# Patient Record
Sex: MALE | Race: WHITE | URBAN - METROPOLITAN AREA
[De-identification: names, ages, dates, MRNs, and addresses within clinical notes are randomized per-mention and may not be internally consistent; named-entity substitution may affect disease eponyms.]

---

## 2017-06-26 ENCOUNTER — TRANSFERRED RECORDS (OUTPATIENT)
Dept: HEALTH INFORMATION MANAGEMENT | Facility: CLINIC | Age: 42
End: 2017-06-26

## 2017-09-22 ENCOUNTER — TRANSFERRED RECORDS (OUTPATIENT)
Dept: HEALTH INFORMATION MANAGEMENT | Facility: CLINIC | Age: 42
End: 2017-09-22

## 2017-11-20 ENCOUNTER — ALLIED HEALTH/NURSE VISIT (OUTPATIENT)
Dept: NEUROLOGY | Facility: CLINIC | Age: 42
End: 2017-11-20

## 2017-11-20 ENCOUNTER — OFFICE VISIT (OUTPATIENT)
Dept: NEUROLOGY | Facility: CLINIC | Age: 42
End: 2017-11-20

## 2017-11-20 VITALS
SYSTOLIC BLOOD PRESSURE: 155 MMHG | HEART RATE: 63 BPM | HEIGHT: 72 IN | BODY MASS INDEX: 34.27 KG/M2 | WEIGHT: 253 LBS | DIASTOLIC BLOOD PRESSURE: 89 MMHG

## 2017-11-20 DIAGNOSIS — R56.9 SEIZURES (H): Primary | ICD-10-CM

## 2017-11-20 DIAGNOSIS — G40.109 LOCALIZATION-RELATED EPILEPSY (H): Primary | ICD-10-CM

## 2017-11-20 RX ORDER — NICOTINE 21 MG/24HR
1 PATCH, TRANSDERMAL 24 HOURS TRANSDERMAL EVERY 24 HOURS
Status: ON HOLD | COMMUNITY
End: 2017-11-22

## 2017-11-20 RX ORDER — OXCARBAZEPINE 300 MG/1
300 TABLET, FILM COATED ORAL 2 TIMES DAILY
Status: ON HOLD | COMMUNITY
End: 2017-11-22

## 2017-11-20 RX ORDER — ESCITALOPRAM OXALATE 20 MG/1
20 TABLET ORAL DAILY
Status: ON HOLD | COMMUNITY
End: 2017-11-22

## 2017-11-20 RX ORDER — POLYETHYLENE GLYCOL 3350 17 G
2 POWDER IN PACKET (EA) ORAL
Status: ON HOLD | COMMUNITY
End: 2017-11-22

## 2017-11-20 RX ORDER — HYDROXYZINE HYDROCHLORIDE 25 MG/1
25 TABLET, FILM COATED ORAL 3 TIMES DAILY PRN
Status: ON HOLD | COMMUNITY
End: 2017-11-22

## 2017-11-20 RX ORDER — ACETAMINOPHEN 325 MG/1
650 TABLET ORAL EVERY 6 HOURS PRN
Status: ON HOLD | COMMUNITY
End: 2017-11-22

## 2017-11-20 NOTE — PROGRESS NOTES
CPT:04633-68  OP[facility]hr Video EEG  [facility]  Dr. Mireles
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
## 2017-11-20 NOTE — MR AVS SNAPSHOT
After Visit Summary   11/20/2017    Rakesh Hamilton    MRN: [id number]           Patient Information     Date Of Birth          1975        Visit Information        Provider Department      11/20/2017 9:30 AM [facility]         Follow-ups after your visit        Your next 10 appointments already scheduled     Nov 20, 2017  1:00 PM CST   New Patient Visit with Sara Parekh MD   [facility] ([facility])    [address], [address]  [facility] [address]   [phone]            Nov 21, 2017 12:30 PM CST   Education with [facility] Nurse 1, [facility] PATIENT EDUCATION   [facility] ([facility])    [address], [address]  [facility] [id number] [phone]            Nov 22, 2017 10:00 AM CST   (Arrive by 9:15 AM)   Video Hookup Visit with [facility] EEG TECH 4   [facility] EEG ([facility])    [facility]  [address] [facility] [address] [phone]           [place]: Your appointment is scheduled at [facility]. [address] [place] [address]              Who to contact     Please call your clinic at [phone] to:    Ask questions about your health    Make or cancel appointments    Discuss your medicines    Learn about your test results    Speak to your doctor   If you have compliments or concerns about an experience at your clinic, or if you wish to file a complaint, please contact [facility] Patient Relations at [phone] or email us at [email or web address]         Additional Information About Your Visit        MyChart Information     BookMyShow is an electronic gateway that provides easy, online access to your medical records. With BookMyShow, you can request a clinic appointment, read your test results, renew a prescription or communicate with your care team.     To sign up for  [facility] visit the website at [email or web address]   You will be asked to enter the access code listed below, as well as some personal information. Please follow the directions to create your username and password.     Your access code is: [email or web address]  Expires: 2018 12:42 PM     Your access code will  in 90 days. If you need help or a new code, please contact your [facility] or call [phone] for assistance.        Care EveryWhere ID     This is your Care EveryWhere ID. This could be used by other organizations to access your [facility] medical records  [id number]         Blood Pressure from Last 3 Encounters:   No data found for BP    Weight from Last 3 Encounters:   No data found for Wt              Today, you had the following     No orders found for display       Primary Care Provider Office Phone # Fax #    Nisa France Palmer [phone]       [address] [place] [address]        Equal Access to Services     [facility] Center[facility]IS : Hadii aad ku hadasho Soomaali, waaxda luqadaha, qaybta kaalmada adeegyada, waxay idiin hayaamatthew starr kharagera brandt . So [facility] [phone].    ATENCIÓN: Si habla español, tiene a darnell disposición servicios gratuitos de asistencia lingüística. Llame al [phone].    We comply with applicable federal civil rights laws and [place] laws. We do not discriminate on the basis of race, color, national origin, age, disability, sex, sexual orientation, or gender identity.            Thank you!     Thank you for choosing [facility] EPILEPSY [facility]  for your care. Our goal is always to provide you with excellent care. Hearing back from our patients is one way we can continue to improve our services. Please take a few minutes to complete the written survey that you may receive in the mail after your visit with us. Thank you!             Your Updated Medication List - Protect others around you: Learn how to safely use,  store and throw away your medicines at www.disposemymeds.org.          This list is accurate as of: 11/20/17 12:42 PM.  Always use your most recent med list.                   Brand Name Dispense Instructions for use Diagnosis    cholecalciferol 5000 UNITS Tabs tablet    vitamin D3     Take 5,000 Units by mouth daily Take 1 tab by mouth daily        COMMIT 2 MG lozenge   Generic drug:  nicotine polacrilex      Place 2 mg inside cheek every hour as needed for smoking cessation Take 1 lozenge by mouth every hour as needed for smoking cessation for up to 30 days        hydrOXYzine 25 MG tablet    ATARAX     Take 25 mg by mouth 3 times daily as needed for itching Take 1 tab by mouth 3 times daily as needed for anxiety        LEXAPRO 20 MG tablet   Generic drug:  escitalopram      Take 20 mg by mouth daily        NICODERM CQ 14 MG/24HR 24 hr patch   Generic drug:  nicotine      Place 1 patch onto the skin every 24 hours Place 1 patch onto the skin daily for 30 days        OXcarbazepine 300 MG tablet    TRILEPTAL     Take 300 mg by mouth 2 times daily Take 3 tabs by mouth 2 times daily        TYLENOL 325 MG tablet   Generic drug:  acetaminophen      Take 650 mg by mouth every 6 hours as needed for mild pain Take 650 mg by mouth every 6 hours as needed for Mild pain 1-3
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
## 2017-11-20 NOTE — MR AVS SNAPSHOT
After Visit Summary   11/20/2017    Rakesh Hamilton    MRN: [id number]           Patient Information     Date Of Birth          1975        Visit Information        Provider Department      11/20/2017 1:00 PM Sara Parekh MD [facility]        Today's Diagnoses     Localization-related epilepsy (H)    -  1       Follow-ups after your visit        Additional Services     [facility] Patient Education Referral                 Your next 10 appointments already scheduled     Nov 21, 2017 12:30 PM CST   Education with [facility] Nurse 1, [facility] PATIENT EDUCATION   [facility]    [address] [place], [address]  [facility] [id number] [phone]            Nov 22, 2017 10:00 AM CST   (Arrive by 9:15 AM)   Video Hookup Visit with [facility] EEG TECH 4   [facility] EEG ([facility])    [facility]  [address] [address] [phone]           [place]: Your appointment is scheduled at [facility]. [address] [place] [address]              Who to contact     Please call your clinic at [phone] to:    Ask questions about your health    Make or cancel appointments    Discuss your medicines    Learn about your test results    Speak to your doctor   If you have compliments or concerns about an experience at your clinic, or if you wish to file a complaint, please contact [facility] Patient Relations at [phone] or email us at [email or web address]         Additional Information About Your Visit        MyChart Information     FrenchWe[employer] is an electronic gateway that provides easy, online access to your medical records. With [facility], you can request a clinic appointment, read your test results, renew a prescription or communicate with your care team.     To sign up for [employer]bt visit the website at [email or web address]    You will be asked to enter the access code listed below, as well as some personal information. Please follow the directions to create your username and password.     Your access code is: [email or web address]  Expires: 2018 12:42 PM     Your access code will  in 90 days. If you need help or a new code, please contact your [facility] or call [phone] for assistance.        Care EveryWhere ID     This is your Care EveryWhere ID. This could be used by other organizations to access your [place] medical records  [id number]        Your Vitals Were     Pulse Height BMI (Body Mass Index)             63 6' (182.9 cm) 34.31 kg/m2          Blood Pressure from Last 3 Encounters:   17 155/89    Weight from Last 3 Encounters:   17 253 lb (114.8 kg)              We Performed the Following     Admit to Inpatient     [facility] Patient Education Referral     Neuropsychological testing        Primary Care Provider Office Phone # Fax #    Nisa France Palmer [phone]       [address] [place] [address]        Equal Access to Services     [facility] AH: Hadii aad ku hadasho Soomaali, waaxda luqadaha, qaybta kaalmada adeegyada, rhiannon brandt . So [facility] [phone].    ATENCIÓN: Si habla español, tiene a darnell disposición servicios gratuitos de asistencia lingüística. Llame al [phone].    We comply with applicable federal civil rights laws and [place] laws. We do not discriminate on the basis of race, color, national origin, age, disability, sex, sexual orientation, or gender identity.            Thank you!     Thank you for choosing [facility]  for your care. Our goal is always to provide you with excellent care. Hearing back from our patients is one way we can continue to improve our services. Please take a few minutes to complete the written survey that you may receive in the mail after your visit with us.  Thank you!             Your Updated Medication List - Protect others around you: Learn how to safely use, store and throw away your medicines at www.disposemymeds.org.          This list is accurate as of: 11/20/17  2:23 PM.  Always use your most recent med list.                   Brand Name Dispense Instructions for use Diagnosis    cholecalciferol 5000 UNITS Tabs tablet    vitamin D3     Take 5,000 Units by mouth daily Take 1 tab by mouth daily        COMMIT 2 MG lozenge   Generic drug:  nicotine polacrilex      Place 2 mg inside cheek every hour as needed for smoking cessation Take 1 lozenge by mouth every hour as needed for smoking cessation for up to 30 days        hydrOXYzine 25 MG tablet    ATARAX     Take 25 mg by mouth 3 times daily as needed for itching Take 1 tab by mouth 3 times daily as needed for anxiety        LEXAPRO 20 MG tablet   Generic drug:  escitalopram      Take 20 mg by mouth daily        NICODERM CQ 14 MG/24HR 24 hr patch   Generic drug:  nicotine      Place 1 patch onto the skin every 24 hours Place 1 patch onto the skin daily for 30 days        OXcarbazepine 300 MG tablet    TRILEPTAL     Take 300 mg by mouth 2 times daily Take 3 tabs by mouth 2 times daily        TYLENOL 325 MG tablet   Generic drug:  acetaminophen      Take 650 mg by mouth every 6 hours as needed for mild pain Take 650 mg by mouth every 6 hours as needed for Mild pain 1-3

## 2017-11-21 ENCOUNTER — ALLIED HEALTH/NURSE VISIT (OUTPATIENT)
Dept: NEUROLOGY | Facility: CLINIC | Age: 42
End: 2017-11-21

## 2017-11-21 ENCOUNTER — TRANSFERRED RECORDS (OUTPATIENT)
Dept: HEALTH INFORMATION MANAGEMENT | Facility: CLINIC | Age: 42
End: 2017-11-21

## 2017-11-21 DIAGNOSIS — G40.109 LOCALIZATION-RELATED FOCAL EPILEPSY WITH SIMPLE PARTIAL SEIZURES (H): Primary | ICD-10-CM

## 2017-11-21 NOTE — NURSING NOTE
"Care Coordinator Visit    Name:  Rakesh Hamilton   Date:    2017   :   1975   MRN:  [id number]     Time Spent:  Face-to-face time 75 minutes  See scanned Wellness Materials checklist regarding videos viewed and handouts provided.     I met with the patient, his mother, and aunt after seizure videos were viewed, as listed in the checklist.     Patient History:  From Dr. 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  The patient has not been compliant with his medications.  He quit taking oxcarbazepine a couple months ago.  He tried levetiracetam and lamotrigine.  He had side effects, but his seizures also were not controlled.  They are looking for nonmedical options, such as surgical options, and they also have applied for medical marijuana.  I explained he has to fail at least 2 medications before we consider surgery.   They state the doses were maxed, but I do not have doses or levels for those.  He is scheduled to get admitted to the Epilepsy Monitoring Unit.  Will try to record a few seizures for characterization and localization.  He would need a a brain MRI to see the extent of his brain injury and the surgeries he had.  Based on imaging, EEG and ictal findings will see how good of a surgical candidate he is.  I suppose he already has lots of scars and adhesions due to his previous surgeries.  We also need to get his records to prove he was refractory to medications.  He would need to start an antiepileptic medication upon discharge.  He has only tried lamotrigine, levetiracetam and oxcarbazepine.  He has a sulfa allergy, so should avoid topiramate and zonisamide.  I have some reservation for Lyrica and Depakote as he is overweight and with Depakote there is also concern if he pursue surgery.  We can try lacosamide, carbamazepine, phenytoin, or Fycompa perhaps in this order.\"      The  What Do You Know About Epilepsy  questionnaire " was reviewed with the patient.  Areas focused on were the difference between generalized and partial seizures, healthy lifestyle choices and non-epileptic events.  We also reviewed all questions that were answered incorrectly.    Basic introduction to epilepsy was done, including the difference between epileptic and nonepileptic events (including physiologic and psychogenic nonepileptic events) and the difference between partial onset and generalized onset epileptic seizures.  We discussed appropriate treatment for each of these and discussed why an accurate diagnosis is important.      We also discussed the importance of adequate sleep and good sleep hygiene, maintaining a healthy diet, taking a multivitamin, and getting exercise.  We discussed that the consumption of alcohol will affect how the body metabolizes medications and that binge drinking can cause withdrawal seizures.  We discussed stress and illness and how these can affect seizure control.  We discussed how over-the counter diet aids, energy drinks, caffeine, and some herbal supplements can affect seizure control.  We also discussed taking showers instead of baths and keeping the shower drain clear so as not to allow water to pool.  We discussed driving. The patient understands that he is responsible for knowing and understanding his  state driving laws as well as for reporting any loss of contact or voluntary control to the Department of Motor Vehicles. We discussed use of firearms; he was informed that we advise him to refrain from use of firearms. Rakesh  also understands the liabilities and risks related to driving and firearm use.    Introduction to [facility] was performed, including hospital policies, how seizures are induced, hygiene breaks, and the importance of staff assistance whenever  he  gets out of bed.  Rakesh is aware that the length of stay is generally five days, but that this is dependent on what is captured on EEG.     During our  general education session Rakesh asked many appropriate questions, which were answered to  his satisfaction.     Rakesh Hamilton comes into clinic today at the request of Dr. Mireles Ordering Provider for Pt Teaching general baseline epilepsy education and preadmission to [facility] VEEG education..      This service provided today was under the supervising provider of the day Dr. Mireles, who was available if needed.    Deepa Solares

## 2017-11-21 NOTE — MR AVS SNAPSHOT
After Visit Summary   11/21/2017    Rakesh Hamilton    MRN: [id number]           Patient Information     Date Of Birth          1975        Visit Information        Provider Department      11/21/2017 12:30 PM 1, [facility] Nurse; [facility] PATIENT EDUCATION [facility]        Today's Diagnoses     Localization-related focal epilepsy with simple partial seizures (H)    -  1       Follow-ups after your visit        Your next 10 appointments already scheduled     Nov 22, 2017 10:00 AM CST   (Arrive by 9:15 AM)   Video Hookup Visit with [facility] EEG TECH 4   [facility] EEG ([facility])    [facility]  [address] [address] [phone]           [place]: Your appointment is scheduled at [facility]. [address] [place] [address]              Who to contact     Please call your clinic at [phone] to:    Ask questions about your health    Make or cancel appointments    Discuss your medicines    Learn about your test results    Speak to your doctor   If you have compliments or concerns about an experience at your clinic, or if you wish to file a complaint, please contact [facility] Patient Relations at [phone] or email us at [email or web address]         Additional Information About Your Visit        MyChart Information     [employer] is an electronic gateway that provides easy, online access to your medical records. With [facility], you can request a clinic appointment, read your test results, renew a prescription or communicate with your care team.     To sign up for [employer] visit the website at [email or web address]   You will be asked to enter the access code listed below, as well as some personal information. Please follow the directions to create your username and password.     Your access code is: [email or web address]  Expires: 2/18/2018 12:42 PM     Your access  code will  in 90 days. If you need help or a new code, please contact your [facility] or call [phone] for assistance.        Care EveryWhere ID     This is your Care EveryWhere ID. This could be used by other organizations to access your [place] medical records  [id number]         Blood Pressure from Last 3 Encounters:   17 155/89    Weight from Last 3 Encounters:   17 253 lb (114.8 kg)              Today, you had the following     No orders found for display       Primary Care Provider Office Phone # Fax #    Nisa France Palmer [phone]       [address] [place] [address]        Equal Access to Services     [facility] : Hadii aad ku hadasho Soomaali, waaxda luqadaha, qaybta kaalmada adeegyada, waxay idiin hayaan adejulius brandt . So [facility] [phone].    ATENCIÓN: Si habla español, tiene a darnell disposición servicios gratuitos de asistencia lingüística. Ll[facility] [phone].    We comply with applicable federal civil rights laws and [place] laws. We do not discriminate on the basis of race, color, national origin, age, disability, sex, sexual orientation, or gender identity.            Thank you!     Thank you for choosing [facility]  for your care. Our goal is always to provide you with excellent care. Hearing back from our patients is one way we can continue to improve our services. Please take a few minutes to complete the written survey that you may receive in the mail after your visit with us. Thank you!             Your Updated Medication List - Protect others around you: Learn how to safely use, store and throw away your medicines at www.disposemymeds.org.          This list is accurate as of: 17  4:06 PM.  Always use your most recent med list.                   Brand Name Dispense Instructions for use Diagnosis    cholecalciferol 5000 UNITS Tabs tablet    vitamin D3     Take 5,000 Units  by mouth daily Take 1 tab by mouth daily        COMMIT 2 MG lozenge   Generic drug:  nicotine polacrilex      Place 2 mg inside cheek every hour as needed for smoking cessation Take 1 lozenge by mouth every hour as needed for smoking cessation for up to 30 days        hydrOXYzine 25 MG tablet    ATARAX     Take 25 mg by mouth 3 times daily as needed for itching Take 1 tab by mouth 3 times daily as needed for anxiety        LEXAPRO 20 MG tablet   Generic drug:  escitalopram      Take 20 mg by mouth daily        NICODERM CQ 14 MG/24HR 24 hr patch   Generic drug:  nicotine      Place 1 patch onto the skin every 24 hours Place 1 patch onto the skin daily for 30 days        OXcarbazepine 300 MG tablet    TRILEPTAL     Take 300 mg by mouth 2 times daily Take 3 tabs by mouth 2 times daily        TYLENOL 325 MG tablet   Generic drug:  acetaminophen      Take 650 mg by mouth every 6 hours as needed for mild pain Take 650 mg by mouth every 6 hours as needed for Mild pain 1-3

## 2017-11-22 ENCOUNTER — HOSPITAL ENCOUNTER (INPATIENT)
Facility: CLINIC | Age: 42
LOS: 2 days | Discharge: HOME | DRG: 101 | End: 2017-11-24
Attending: PSYCHIATRY & NEUROLOGY | Admitting: PSYCHIATRY & NEUROLOGY
Payer: MEDICARE

## 2017-11-22 ENCOUNTER — ALLIED HEALTH/NURSE VISIT (OUTPATIENT)
Dept: NEUROLOGY | Facility: CLINIC | Age: 42
DRG: 101 | End: 2017-11-22
Attending: PSYCHIATRY & NEUROLOGY
Payer: MEDICARE

## 2017-11-22 DIAGNOSIS — G40.319 GENERALIZED CONVULSIVE EPILEPSY WITH INTRACTABLE EPILEPSY (H): Primary | ICD-10-CM

## 2017-11-22 DIAGNOSIS — E55.9 VITAMIN D DEFICIENCY: ICD-10-CM

## 2017-11-22 DIAGNOSIS — G40.109 LOCALIZATION-RELATED FOCAL EPILEPSY WITH SIMPLE PARTIAL SEIZURES (H): Primary | ICD-10-CM

## 2017-11-22 LAB
ALBUMIN SERPL-MCNC: 3.8 G/DL (ref 3.4–5)
ALP SERPL-CCNC: 108 U/L (ref 40–150)
ALT SERPL W P-5'-P-CCNC: 29 U/L (ref 0–70)
ANION GAP SERPL CALCULATED.3IONS-SCNC: 8 MMOL/L (ref 3–14)
AST SERPL W P-5'-P-CCNC: 13 U/L (ref 0–45)
BILIRUB SERPL-MCNC: 0.3 MG/DL (ref 0.2–1.3)
BUN SERPL-MCNC: 11 MG/DL (ref 7–30)
CALCIUM SERPL-MCNC: 8.8 MG/DL (ref 8.5–10.1)
CHLORIDE SERPL-SCNC: 112 MMOL/L (ref 94–109)
CO2 SERPL-SCNC: 23 MMOL/L (ref 20–32)
CREAT SERPL-MCNC: 0.87 MG/DL (ref 0.66–1.25)
DEPRECATED CALCIDIOL+CALCIFEROL SERPL-MC: 19 UG/L (ref 20–75)
ERYTHROCYTE [DISTWIDTH] IN BLOOD BY AUTOMATED COUNT: 14.2 % (ref 10–15)
GFR SERPL CREATININE-BSD FRML MDRD: >90 ML/MIN/1.7M2
GLUCOSE SERPL-MCNC: 95 MG/DL (ref 70–99)
HCT VFR BLD AUTO: 50.2 % (ref 40–53)
HGB BLD-MCNC: 17.2 G/DL (ref 13.3–17.7)
MCH RBC QN AUTO: 31.9 PG (ref 26.5–33)
MCHC RBC AUTO-ENTMCNC: 34.3 G/DL (ref 31.5–36.5)
MCV RBC AUTO: 93 FL (ref 78–100)
PLATELET # BLD AUTO: 255 10E9/L (ref 150–450)
POTASSIUM SERPL-SCNC: 4.2 MMOL/L (ref 3.4–5.3)
PROT SERPL-MCNC: 7.3 G/DL (ref 6.8–8.8)
RBC # BLD AUTO: 5.4 10E12/L (ref 4.4–5.9)
SODIUM SERPL-SCNC: 144 MMOL/L (ref 133–144)
WBC # BLD AUTO: 7.4 10E9/L (ref 4–11)

## 2017-11-22 PROCEDURE — 25000132 ZZH RX MED GY IP 250 OP 250 PS 637: Mod: GY | Performed by: PHYSICIAN ASSISTANT

## 2017-11-22 PROCEDURE — 36415 COLL VENOUS BLD VENIPUNCTURE: CPT | Performed by: PHYSICIAN ASSISTANT

## 2017-11-22 PROCEDURE — 80053 COMPREHEN METABOLIC PANEL: CPT | Performed by: PHYSICIAN ASSISTANT

## 2017-11-22 PROCEDURE — 40000802 ZZH SITE CHECK

## 2017-11-22 PROCEDURE — A9270 NON-COVERED ITEM OR SERVICE: HCPCS | Mod: GY | Performed by: PHYSICIAN ASSISTANT

## 2017-11-22 PROCEDURE — 85027 COMPLETE CBC AUTOMATED: CPT | Performed by: PHYSICIAN ASSISTANT

## 2017-11-22 PROCEDURE — 82306 VITAMIN D 25 HYDROXY: CPT | Performed by: PHYSICIAN ASSISTANT

## 2017-11-22 PROCEDURE — 95951 ZZHC EEG VIDEO EACH 24 HR: CPT | Mod: ZF

## 2017-11-22 PROCEDURE — 40000141 ZZH STATISTIC PERIPHERAL IV START W/O US GUIDANCE

## 2017-11-22 PROCEDURE — 12000008 ZZH R&B INTERMEDIATE UMMC

## 2017-11-22 RX ORDER — DOCUSATE SODIUM 100 MG/1
100 CAPSULE, LIQUID FILLED ORAL 2 TIMES DAILY PRN
Status: DISCONTINUED | OUTPATIENT
Start: 2017-11-22 | End: 2017-11-24 | Stop reason: HOSPADM

## 2017-11-22 RX ORDER — IBUPROFEN 200 MG
200 TABLET ORAL EVERY 6 HOURS PRN
Status: DISCONTINUED | OUTPATIENT
Start: 2017-11-22 | End: 2017-11-24 | Stop reason: HOSPADM

## 2017-11-22 RX ORDER — LIDOCAINE 40 MG/G
CREAM TOPICAL
Status: DISCONTINUED | OUTPATIENT
Start: 2017-11-22 | End: 2017-11-24 | Stop reason: HOSPADM

## 2017-11-22 RX ORDER — LORAZEPAM 2 MG/ML
2 INJECTION INTRAMUSCULAR
Status: DISCONTINUED | OUTPATIENT
Start: 2017-11-22 | End: 2017-11-24 | Stop reason: HOSPADM

## 2017-11-22 RX ORDER — NICOTINE 21 MG/24HR
1 PATCH, TRANSDERMAL 24 HOURS TRANSDERMAL DAILY
Status: DISCONTINUED | OUTPATIENT
Start: 2017-11-22 | End: 2017-11-24 | Stop reason: HOSPADM

## 2017-11-22 RX ORDER — GINSENG 100 MG
CAPSULE ORAL 3 TIMES DAILY PRN
Status: DISCONTINUED | OUTPATIENT
Start: 2017-11-22 | End: 2017-11-24 | Stop reason: HOSPADM

## 2017-11-22 RX ORDER — MULTIVITAMIN,THERAPEUTIC
1 TABLET ORAL DAILY
Status: DISCONTINUED | OUTPATIENT
Start: 2017-11-22 | End: 2017-11-24 | Stop reason: HOSPADM

## 2017-11-22 RX ORDER — ACETAMINOPHEN 325 MG/1
650 TABLET ORAL EVERY 4 HOURS PRN
Status: DISCONTINUED | OUTPATIENT
Start: 2017-11-22 | End: 2017-11-24 | Stop reason: HOSPADM

## 2017-11-22 RX ADMIN — NICOTINE 1 PATCH: 14 PATCH, EXTENDED RELEASE TRANSDERMAL at 11:58

## 2017-11-22 ASSESSMENT — ACTIVITIES OF DAILY LIVING (ADL)
FALL_HISTORY_WITHIN_LAST_SIX_MONTHS: NO
BATHING: 0-->INDEPENDENT
DRESS: 0-->INDEPENDENT
AMBULATION: 0-->INDEPENDENT
COGNITION: 0 - NO COGNITION ISSUES REPORTED
TRANSFERRING: 0-->INDEPENDENT
RETIRED_EATING: 0-->INDEPENDENT
RETIRED_COMMUNICATION: 0-->UNDERSTANDS/COMMUNICATES WITHOUT DIFFICULTY
SWALLOWING: 0-->SWALLOWS FOODS/LIQUIDS WITHOUT DIFFICULTY
TOILETING: 0-->INDEPENDENT

## 2017-11-22 NOTE — IP AVS SNAPSHOT
MRN:[id number]                      After Visit Summary   11/22/2017    Rakesh Hamilton    MRN: [id number]           Thank you!     Thank you for choosing [facility] for your care. Our goal is always to provide you with excellent care. Hearing back from our patients is one way we can continue to improve our services. Please take a few minutes to complete the written survey that you may receive in the mail after you visit with us. Thank you!        Patient Information     Date Of Birth          1975        Designated Caregiver       Most Recent Value    Caregiver    Will someone help with your care after discharge? yes    Name of designated caregiver Sabrina Hamilton     Phone number of caregiver [phone]    Caregiver address same as patient      About your hospital stay     You were admitted on:  November 22, 2017 You last received care in the:  [address] [facility]    You were discharged on:  November 24, 2017        Reason for your hospital stay       You were admitted to the epilepsy monitoring unit at the [facility] for video EEG from 11/22 to 11/24/2017 to characterize your seizures. You did not have any events during your admission. You requested to leave on Day 3 of the study and so this was arranged.                  Who to Call     For medical emergencies, please call 911.  For non-urgent questions about your medical care, please call your primary care provider or clinic, [phone]          Attending Provider     Provider Specialty    Amparo Richter MD Neurology       Primary Care Provider Office Phone # Fax #    Nisa Palmer [phone]       When to contact your care team       Contact [facility] at [phone] for questions regarding your seizures.                  After Care Instructions     Activity       State laws prohibit operating a motor vehicle following any seizure or other episode with loss of  "awareness, or inability to sit up (Varies by state, be aware and comply with the regulations applicable to you). State law may require the licensed  to report any future episode to the DMV . Avoid any activities that might lead to self-injury or injury of others following any event with impaired awareness or impaired motor control. Such activities include but are not limited to holding babies or young children at heights from which they might be injured if dropped, bathing infants or young children in situations in which they might drown without continuous interactive care by an adult who is fully capable at all times during the bath, operating power cutting or other tools, handling firearms, exposure to heights from which you might fall, exposure to vessels with hot cooking oil or water, and swimming alone.            Diet       Resume home diet.            Discharge Instructions       Start taking 2000 mg of vitamin D supplement every day.  Start carbamazepine (Tegretol) and increase as directed.    Try to maintain a regular daily routine, with 8 hours of sleep, regular healthy meals, and routine activity/exercise.   Learn stress reduction techniques, such as controlled breathing exercises and meditation/mindfullness.   Avoid getting over tired, alcohol, and unprescribed drugs.  Avoid herbal remedies, as these may contain substances that alter how your body handles medications, or have undesirable effects on you.    Discuss any over the counter medications with your pharmacist or other health care provider.  If your mood worsens and you have thoughts of hurting yourself or others, seek help immediately. This can be done by calling emergency services \"9-1-1\", or calling other mental health resources such as National Suicide Prevention LifeLine 4-067-807-WSQP (3615), your local [facility] help line, or calling your mental health provider.      Knowing how to relax is not something our society teaches. In fact, it " "teaches us to do the opposite. The relaxation response was developed by the [place]  and physician Dr. Carlos Vee. You can google both \"Carlos Vee\" and \"Relaxation Response\" to find a multitude of information. Breath work is the center of this process and can reduce anxiety and stress markedly.    Dr. Vee talks about the need for four things:    1. A quiet environment    3. A passive mental attitude  2. A comfortable position    4. A mental device    The passive mental attitude means that when distracting thoughts come into your mind while deep breathing, you do not  them. Instead of saying to yourself, \"Darn, I just can't calm my mind,\" say, \"Oh, that was interesting,\" and then refocus on the breath. Our brains are a complex network of connections that were meant to fire. It takes practice, lots of practice to focus and breathe. Try not to  your thoughts as good or bad, they just are.    The mental device can be just about anything. It can be meditation, prayer, saying one sound out loud, whatever you want it to be. The key is to breathe from your belly. Place your hand on your belly. As you inhale to the count of 3 through your nose, feel your hand rising. This helps to remind you to get the breath all the way down into the belly. Exhale to the count of 6 (or double whatever the count of the inhale) through pursed lips. Pursed lips keep the exhale lasting longer and decreases the loss of carbon dioxide. When we breathe fast from our chest, we lose too much carbon dioxide which can cause many of the physical symptoms of anxiety (tremor, lightheadedness, ringing in the ear, feeling of unreality).     So, inhale through your nose, think 'All is well,' Exhale through pursed lips, thinking 'This and every day'. Do this twice a day, first thing in the morning and before dinner in the evening. Do it for a minimum of 10 minutes, a maximum of 20 minutes (Dr. Carlos Vee's Relaxation " Response). These two phrases are just suggestions. Change it to whatever resonates with you.    Remember, practice this daily, so that you will have this skill when you need it most.     (Text used with permission from Davina Hastings, RN, BA, HN-BC)                  Follow-up Appointments     [facility] Follow-up and recommended labs and tests       The following appointments have been made for you:    Follow up telephone visit with Vivi COLVIN RN Care Coordinator Monday Nov 27 11:30.  Follow up telephone visit with Dr. Mireles in Dec 14th at 11:45am            Follow Up and recommended labs and tests       Make an appointment with a local psychiatrist to manage anxiety and depression.                  Your next 10 appointments already scheduled     Nov 27, 2017 11:30 AM CST   Telephone Call with [facility] Nurse 2   [facility])    [address], [address]  [facility] [address] [phone]           Note: this is not an onsite visit; there is no need to come to the facility.            Dec 14, 2017 11:45 AM CST   Telephone Call with Sara Parekh MD   [facility] ([facility])    [address], [address]  [facility] [address] [phone]           Note: This is not an onsite visit; there is no need to come to the facility.              Future tests that were ordered for you     Sodium           Carbamazepine and 10 11 epoxide                 Further instructions from your care team         Times of Days   am  pm      Medication Tablet Size Number of Tablets/Capsules Total Daily Dosage     carbamazepine ER  200 mg               Day 1-3     0 tab    1 tab  (200 mg)    200     Day 4-7     1 tab  (200 mg   1 tab  (200 mg   400     Day 8-14     1 tab  (200 mg   2 tab   (400 mg)    600     Day 14 and onward     2 tab   (400 mg)    2 tab   (400 mg)                             YOU MAY HOLD ON THE INCREASE ON DAY 8 IF YOU FEEL UNSTEADY,  "DIZZY, OR NAUSEATED THEN INCREASE AS SYMPTOMS SUBSIDE.   Please have your levels check in 3 weeks from start of medication.     * * *Do not store medications in the bathroom.  Keep medications away from children!* * *       Pending Results     No orders found from 2017 to 2017.            Statement of Approval     Ordered          17 1229  I have reviewed and agree with all the recommendations and orders detailed in this document.  EFFECTIVE NOW     Approved and electronically signed by:  Amparo Richter MD             Admission Information     Date & Time Provider Department Dept. Phone    2017 Amparo Richter MD [address] [facility] [phone]      Your Vitals Were     Blood Pressure Pulse Temperature Respirations Height Weight    151/89 60 96  F (35.6  C) (Oral) 16 1.829 m (6') 113.6 kg (250 lb 8 oz)    Pulse Oximetry BMI (Body Mass Index)                99% 33.97 kg/m2          MyChart Information     [employer] lets you send messages to your doctor, view your test results, renew your prescriptions, schedule appointments and more. To sign up, go to [email or web address] . Click on \"Log in\" on the left side of the screen, which will take you to the Welcome page. Then click on \"Sign up Now\" on the right side of the page.     You will be asked to enter the access code listed below, as well as some personal information. Please follow the directions to create your username and password.     Your access code is: [email or web address]  Expires: 2018 12:42 PM     Your access code will  in 90 days. If you need help or a new code, please call your [facility] clinic or [phone].        Care EveryWhere ID     This is your Care EveryWhere ID. This could be used by other organizations to access your [facility] medical records  [id number]        Equal Access to Services     LA DORMAN AH: Merlene Agee, anthony mckeon, gunjan marsh, rhiannon jean " laleslie ahBonnie So [facility] [phone].    ATENCIÓN: Si habla bay, tiene a darnell disposición servicios gratuitos de asistencia lingüística. Guilherme al [phone].    We comply with applicable federal civil rights laws and [place] laws. We do not discriminate on the basis of race, color, national origin, age, disability, sex, sexual orientation, or gender identity.               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 Increase as instructed.   Quantity:  120 capsule   Refills:  3       cholecalciferol 1000 UNITS capsule   Commonly known as:  vitamin  -D   Used for:  Vitamin D deficiency        Dose:  2 capsule   Take 2 capsules (2,000 Units) by mouth daily   Quantity:  60 capsule   Refills:  11            Where to get your medicines      These medications were sent to [facility] [address] [address]     Phone:  [phone]     carBAMazepine 200 MG 12 hr capsule    cholecalciferol 1000 UNITS capsule                Protect others around you: Learn how to safely use, store and throw away your medicines at www.disposemymeds.org.             Medication List: This is a list of all your medications and when to take them. Check marks below indicate your daily home schedule. Keep this list as a reference.      Medications           Morning Afternoon Evening Bedtime As Needed    carBAMazepine 200 MG 12 hr capsule   Commonly known as:  CARBATROL   Start with 1 tablet (200mg) at night. Increase as instructed.                                cholecalciferol 1000 UNITS capsule   Commonly known as:  vitamin  -D   Take 2 capsules (2,000 Units) by mouth daily

## 2017-11-22 NOTE — MR AVS SNAPSHOT
After Visit Summary   2017    Rakesh Hamilton    MRN: [id number]           Patient Information     Date Of Birth          1975        Visit Information        Provider Department      2017 10:00 AM [facility] EEG TECH 4 [facility] EEG        Today's Diagnoses     Localization-related focal epilepsy with simple partial seizures (H)    -  1       Follow-ups after your visit        Who to contact     Please call your clinic at [phone] to:    Ask questions about your health    Make or cancel appointments    Discuss your medicines    Learn about your test results    Speak to your doctor   If you have compliments or concerns about an experience at your clinic, or if you wish to file a complaint, please contact [facility] Patient Relations at [phone] or email us at [email or web address]         Additional Information About Your Visit        MyChart Information     [employer] is an electronic gateway that provides easy, online access to your medical records. With [employer], you can request a clinic appointment, read your test results, renew a prescription or communicate with your care team.     To sign up for [employer] visit the website at [email or web address]   You will be asked to enter the access code listed below, as well as some personal information. Please follow the directions to create your username and password.     Your access code is: [email or web address]  Expires: 2018 12:42 PM     Your access code will  in 90 days. If you need help or a new code, please contact your [facility] or call [phone] for assistance.        Care EveryWhere ID     This is your Care EveryWhere ID. This could be used by other organizations to access your [facility] medical records  [id number]         Blood Pressure from Last 3 Encounters:   17 117/76   17 155/89    Weight from Last 3 Encounters:   17 113.6 kg (250 lb 8  oz)   11/20/17 114.8 kg (253 lb)              Today, you had the following     No orders found for display         Today's Medication Changes      Notice     This visit is during an admission. Changes to the med list made in this visit will be reflected in the After Visit Summary of the admission.             Primary Care Provider Office Phone # Fax #    Nisa France Palmer [phone]       [address] [place] [address]        Equal Access to Services     [facility]: Hadii aad ku hadasho Soomaali, waaxda luqadaha, qaybta kaalmada adeegyada, waxay idiin hayaan adeeg kharagera brandt . So [facility] [phone].    ATENCIÓN: Si habla español, tiene a darnell disposición servicios gratuitos de asistencia lingüística. [facility] [phone].    We comply with applicable federal civil rights laws and [place] laws. We do not discriminate on the basis of race, color, national origin, age, disability, sex, sexual orientation, or gender identity.            Thank you!     Thank you for choosing [facility]  for your care. Our goal is always to provide you with excellent care. Hearing back from our patients is one way we can continue to improve our services. Please take a few minutes to complete the written survey that you may receive in the mail after your visit with us. Thank you!             Your Updated Medication List - Protect others around you: Learn how to safely use, store and throw away your medicines at www.disposemymeds.org.      Notice     This visit is during an admission. Changes to the med list made in this visit will be reflected in the After Visit Summary of the admission.
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
## 2017-11-22 NOTE — LETTER
10/12/2017       RE: Rakesh Hamilton  : 1975   MRN: [id number]      Dear Colleague,    Thank you for referring your patient, Rakesh Hamilton, to the [facility] at [facility]. Please see a copy of my visit note below.    Pt arrived to 6A. Accompanied by Mom    [facility], [place]   Epilepsy Service Daily Note      Interval History:   Patient had no seizures overnight. No major complaints. He has a headache.     Review of System:   No nausea, No vomitting, no abdominal pain, no headaches, no dizziness, no chest pain.     Medications:   Antiepileptic Medications Home Doses: he stopped taking oxcarbazepine 2017 at home.   Antiepileptic Medications Current Doses: none    Exam: Blood pressure (!) 152/97, pulse 66, temperature 96.8  F (36  C), temperature source Oral, resp. rate 16, height 1.829 m (6'), weight 113.6 kg (250 lb 8 oz), SpO2 96 %.  General appearance: No acute distress, awake  Neuro: Pupils are equal, round, and reactive to light, face symmetric, no pronator drip, equal  strength, reflexes are symmetric, normal to light touch with no sensory deficits noted, finger to nose normal, no focal deficits noted.    Video EEG: no events on video. EEG is normal. There are rare sharp transient in the left fronto-central region. We will monitor these.     Assessment:   1. 42 year old right handed male with a h/o HTN, TBI in 3/2015 followed by seizures started since 3/2016 is being admitted for characterization of his seizures and pre- surgical evaluation. Patient is not on an antiepileptic drug right now. We need to record seizures.     Antiepileptic drug discussion: Patient and mom feel that he was not able to tolerate oxcarbazepine (irritablity), while on levetiracetam he was very tearful, and lamotrigine was not effective (they are not sure of dose or levels). I suspect he may have a focal epilepsy secondary to  traumatic brain injury and would do best on a Na+ blocking agent such as carbamazepine or lamotrigine.     2. HTN: BP is high, we will monitor.      Plan:   1. Sleep deprive tonight. Stay awake until 4 am 11/24/17, sleep for 2 hours, then stay awake until 10 pm 11/24/17. No naps. May discontinue if the patient has a convulsion. This was reviewed with patient and he/she is agreeable with this plan.   2. Ambulate three times a day     3. Ativan for any generalized tonic-clonic convulsion      Type of target event identified: staring spell and convulsions  Number of events:  none recorded thus far, would like to record 5 target spells for presurgical evaluation   Discharge medication plan: To be decided  Further Imaging studies needed prior to discharge: No imaging required prior to discharge  Discharge transportation: family to provide  Other pertinent issues/goals for discharge: No        Attestation:  This patient was seen and evaluated by me, Amparo Richter MD, separately from the house staff team or resident(s).  I have reviewed today's vital signs, medications, labs.    Total time: 15  minute was spent in the care of this patient. The patient agrees with the above mentioned plan of care. I answered all the patient's questions and addressed immediate concerns. Majority of the time spent consisted of counseling and coordinating care, including discussion of the diagnostic significance of EEG findings, anti-seizure medication management, and planning for discharge home.    Amparo Richter MD  Epilepsy Service Attending  [phone]        Again, thank you for allowing me to participate in the care of your patient.      Sincerely,    No name on file.

## 2017-11-22 NOTE — IP AVS SNAPSHOT
[address] [address]    Phone:  [phone]                                       After Visit Summary   11/22/2017    Rakesh Hamilton    MRN: [id number]           After Visit Summary Signature Page     I have received my discharge instructions, and my questions have been answered. I have discussed any challenges I see with this plan with the nurse or doctor.    ..........................................................................................................................................  Patient/Patient Representative Signature      ..........................................................................................................................................  Patient Representative Print Name and Relationship to Patient    ..................................................               ................................................  Date                                            Time    ..........................................................................................................................................  Reviewed by Signature/Title    ...................................................              ..............................................  Date                                                            Time

## 2017-11-23 ENCOUNTER — ALLIED HEALTH/NURSE VISIT (OUTPATIENT)
Dept: NEUROLOGY | Facility: CLINIC | Age: 42
DRG: 101 | End: 2017-11-23
Attending: PSYCHIATRY & NEUROLOGY
Payer: MEDICARE

## 2017-11-23 DIAGNOSIS — G40.109 LOCALIZATION-RELATED FOCAL EPILEPSY WITH SIMPLE PARTIAL SEIZURES (H): Primary | ICD-10-CM

## 2017-11-23 PROCEDURE — 25000132 ZZH RX MED GY IP 250 OP 250 PS 637: Mod: GY | Performed by: PHYSICIAN ASSISTANT

## 2017-11-23 PROCEDURE — A9270 NON-COVERED ITEM OR SERVICE: HCPCS | Mod: GY | Performed by: PHYSICIAN ASSISTANT

## 2017-11-23 PROCEDURE — 12000001 ZZH R&B MED SURG/OB UMMC

## 2017-11-23 PROCEDURE — 95951 ZZHC EEG VIDEO EACH 24 HR: CPT | Mod: ZF

## 2017-11-23 RX ADMIN — THERA TABS 1 TABLET: TAB at 07:22

## 2017-11-23 RX ADMIN — NICOTINE 1 PATCH: 14 PATCH, EXTENDED RELEASE TRANSDERMAL at 07:22

## 2017-11-23 ASSESSMENT — VISUAL ACUITY
OU: NORMAL ACUITY
OU: NORMAL ACUITY

## 2017-11-23 NOTE — PLAN OF CARE
Problem: Patient Care Overview  Goal: Plan of Care/Patient Progress Review  Outcome: No Change  VEEG leads intact. No events this shift. Intact. Regular diet. PIV SL. Mom at bedside. Up with SBA. Slept between cares. Continue to monitor.

## 2017-11-23 NOTE — PROGRESS NOTES
[facility], [place]   Epilepsy Service Daily Note      Interval History:   Patient had no seizures overnight. No major complaints. He has a headache.     Review of System:   No nausea, No vomitting, no abdominal pain, no headaches, no dizziness, no chest pain.     Medications:   Antiepileptic Medications Home Doses: he stopped taking oxcarbazepine September 2017 at home.   Antiepileptic Medications Current Doses: none    Exam: Blood pressure (!) 152/97, pulse 66, temperature 96.8  F (36  C), temperature source Oral, resp. rate 16, height 1.829 m (6'), weight 113.6 kg (250 lb 8 oz), SpO2 96 %.  General appearance: No acute distress, awake  Neuro: Pupils are equal, round, and reactive to light, face symmetric, no pronator drip, equal  strength, reflexes are symmetric, normal to light touch with no sensory deficits noted, finger to nose normal, no focal deficits noted.    Video EEG: no events on video. EEG is normal. There are rare sharp transient in the left fronto-central region. We will monitor these.     Assessment:   1. 42 year old right handed male with a h/o HTN, TBI in 3/2015 followed by seizures started since 3/2016 is being admitted for characterization of his seizures and pre- surgical evaluation. Patient is not on an antiepileptic drug right now. We need to record seizures.     Antiepileptic drug discussion: Patient and mom feel that he was not able to tolerate oxcarbazepine (irritablity), while on levetiracetam he was very tearful, and lamotrigine was not effective (they are not sure of dose or levels). I suspect he may have a focal epilepsy secondary to traumatic brain injury and would do best on a Na+ blocking agent such as carbamazepine or lamotrigine.     2. HTN: BP is high, we will monitor.      Plan:   1. Sleep deprive tonight. Stay awake until 4 am 11/24/17, sleep for 2 hours, then stay awake until 10 pm 11/24/17. No naps. May discontinue if the patient has a  convulsion. This was reviewed with patient and he/she is agreeable with this plan.   2. Ambulate three times a day     3. Ativan for any generalized tonic-clonic convulsion      Type of target event identified: staring spell and convulsions  Number of events: none recorded thus far, would like to record 5 target spells for presurgical evaluation   Discharge medication plan: To be decided  Further Imaging studies needed prior to discharge: No imaging required prior to discharge  Discharge transportation: family to provide  Other pertinent issues/goals for discharge: No       Attestation:  This patient was seen and evaluated by me, Amparo Richter MD, separately from the house staff team or resident(s).  I have reviewed today's vital signs, medications, labs.    Total time: 15  minute was spent in the care of this patient. The patient agrees with the above mentioned plan of care. I answered all the patient's questions and addressed immediate concerns. Majority of the time spent consisted of counseling and coordinating care, including discussion of the diagnostic significance of EEG findings, anti-seizure medication management, and planning for discharge home.    Amparo Richter MD  Epilepsy Service Attending  [phone]

## 2017-11-23 NOTE — MR AVS SNAPSHOT
After Visit Summary   2017    Rakesh Hamilton    MRN: [id number]           Patient Information     Date Of Birth          1975        Visit Information        Provider Department      2017 7:00 AM [facility] EEG TECH 4 [facility] EEG        Today's Diagnoses     Localization-related focal epilepsy with simple partial seizures (H)    -  1       Follow-ups after your visit        Your next 10 appointments already scheduled     2017  7:00 AM CST   24 Hour Video Visit with [facility] EEG TECH 4   [facility] EEG ([facility])    [address] [address] [phone]           [place]: Your appointment is scheduled at [facility] [place] [facility]. [address] [place] [address]              Who to contact     Please call your clinic at [phone] to:    Ask questions about your health    Make or cancel appointments    Discuss your medicines    Learn about your test results    Speak to your doctor   If you have compliments or concerns about an experience at your clinic, or if you wish to file a complaint, please contact [facility] Patient Relations at [phone] or email us at [email or web address]         Additional Information About Your Visit        MyChart Information     [employer] is an electronic gateway that provides easy, online access to your medical records. With [employer], you can request a clinic appointment, read your test results, renew a prescription or communicate with your care team.     To sign up for [employer] visit the website at [email or web address]   You will be asked to enter the access code listed below, as well as some personal information. Please follow the directions to create your username and password.     Your access code is: [email or web address]  Expires: 2018 12:42 PM     Your access code will  in 90 days. If you need help or a new code,  please contact your [facility] or call [phone] for assistance.        Care EveryWhere ID     This is your Care EveryWhere ID. This could be used by other organizations to access your [facility] medical records  [id number]         Blood Pressure from Last 3 Encounters:   11/23/17 (!) 152/97   11/20/17 155/89    Weight from Last 3 Encounters:   11/22/17 113.6 kg (250 lb 8 oz)   11/20/17 114.8 kg (253 lb)              Today, you had the following     No orders found for display         Today's Medication Changes      Notice     This visit is during an admission. Changes to the med list made in this visit will be reflected in the After Visit Summary of the admission.             Primary Care Provider Office Phone # Fax #    Nisa France Palmer [phone]       [facility] [address] [place] [address]        Equal Access to Services     [facility]: Hadii aad ku hadasho Soomaali, waaxda luqadaha, qaybta kaalmada adeegyada, waxay idiin haymelidan matty brandt . So [facility] [phone].    ATENCIÓN: Si habla español, tiene a darnell disposición servicios gratuitos de asistencia lingüística. Llame al [phone].    We comply with applicable federal civil rights laws and [place] laws. We do not discriminate on the basis of race, color, national origin, age, disability, sex, sexual orientation, or gender identity.            Thank you!     Thank you for choosing [facility]  for your care. Our goal is always to provide you with excellent care. Hearing back from our patients is one way we can continue to improve our services. Please take a few minutes to complete the written survey that you may receive in the mail after your visit with us. Thank you!             Your Updated Medication List - Protect others around you: Learn how to safely use, store and throw away your medicines at www.disposemymeds.org.      Notice     This visit is during an admission. Changes to  the med list made in this visit will be reflected in the After Visit Summary of the admission.
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
## 2017-11-23 NOTE — PLAN OF CARE
Problem: Patient Care Overview  Goal: Plan of Care/Patient Progress Review  Outcome: No Change  [id number]  A&O x4; uses call light appropriately. VSS. All neuros intact. VEEG monitoring. No events this shift. Seizure pads in place. Bed alarm on. PIV SL. Up with SBA. Voiding without difficulty. Continue to monitor and follow POC.

## 2017-11-23 NOTE — PLAN OF CARE
Problem: Patient Care Overview  Goal: Plan of Care/Patient Progress Review  Outcome: No Change  VSS aside from HTN within parameters. Neuro's intact. VEEG leads in place; no events witnessed or reported. PIV SL. Adequate PO. Voiding spontaneously. Up with SBA; Ambulated x1. Pt to be sleep deprived tonight. Will continue to monitor and update MD's accordingly.

## 2017-11-23 NOTE — PLAN OF CARE
Problem: Seizure Disorder/Epilepsy (Adult)  Goal: Signs and Symptoms of Listed Potential Problems Will be Absent, Minimized or Managed (Seizure Disorder/Epilepsy)  Signs and symptoms of listed potential problems will be absent, minimized or managed by discharge/transition of care (reference Seizure Disorder/Epilepsy (Adult) CPG).   Outcome: No Change  0930-1930. VEEG monitoring. No events this shift. Seizure pads in place. Pt educated on seizure precautions. Bed alarm on. PIV is SL. VSS. Neuros intact. Up with SBA. Voiding. Using call light. Continue with POC.

## 2017-11-24 ENCOUNTER — ALLIED HEALTH/NURSE VISIT (OUTPATIENT)
Dept: NEUROLOGY | Facility: CLINIC | Age: 42
DRG: 101 | End: 2017-11-24
Attending: PSYCHIATRY & NEUROLOGY
Payer: MEDICARE

## 2017-11-24 VITALS
TEMPERATURE: 96 F | SYSTOLIC BLOOD PRESSURE: 151 MMHG | RESPIRATION RATE: 16 BRPM | OXYGEN SATURATION: 99 % | HEIGHT: 72 IN | BODY MASS INDEX: 33.93 KG/M2 | WEIGHT: 250.5 LBS | HEART RATE: 60 BPM | DIASTOLIC BLOOD PRESSURE: 89 MMHG

## 2017-11-24 DIAGNOSIS — R56.9 SEIZURES (H): Primary | ICD-10-CM

## 2017-11-24 PROCEDURE — 84295 ASSAY OF SERUM SODIUM: CPT | Performed by: PSYCHIATRY & NEUROLOGY

## 2017-11-24 PROCEDURE — 95951 ZZHC EEG VIDEO < 12 HR: CPT | Mod: 52,ZF

## 2017-11-24 PROCEDURE — 80156 ASSAY CARBAMAZEPINE TOTAL: CPT | Performed by: PSYCHIATRY & NEUROLOGY

## 2017-11-24 RX ORDER — CARBAMAZEPINE 200 MG/1
CAPSULE, EXTENDED RELEASE ORAL
Qty: 120 CAPSULE | Refills: 3 | Status: SHIPPED | OUTPATIENT
Start: 2017-11-24

## 2017-11-24 RX ORDER — CARBAMAZEPINE 200 MG/1
CAPSULE, EXTENDED RELEASE ORAL
Qty: 120 CAPSULE | Refills: 3 | Status: SHIPPED | OUTPATIENT
Start: 2017-11-24 | End: 2017-11-24

## 2017-11-24 ASSESSMENT — VISUAL ACUITY
OU: NORMAL ACUITY
OU: NORMAL ACUITY

## 2017-11-24 NOTE — PLAN OF CARE
Problem: Patient Care Overview  Goal: Plan of Care/Patient Progress Review  Outcome: Adequate for Discharge Date Met: 11/24/17  Pt on 6A for VEEG monitoring. VSS aside from HTN. No events witness or reported. Neuro's intact. VEEG leads removed, pt showered. Pt sleep deprived last night; awake until 0400, slept unitl 0600. PIV removved. Regular diet. Voiding spontaneously. Up with SBA and GB. Reviewed d/c paperwork with pt and mother. No further questions. Pt left ambulatory with all belongings.
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
## 2017-11-24 NOTE — PLAN OF CARE
Problem: Patient Care Overview  Goal: Plan of Care/Patient Progress Review  Outcome: No Change  Pt on 6A for VEEG monitoring. VSS aside from HTN. Neuro's intact. VEEG leads in place; no events witnessed or reported. Pt sleep deprived last night; awake until 0400, slept unitl 0600, and to be up until 2200 tonight. Pt also requesting to leave today or tomorrow. Charge RN aware and to be passed on to Epilepsy service to discuss with pt today. PIV SL. Regular diet. Voiding spontaneously. Up with SBA and GB. Ambulated halls x1 overnight. Continue to monitor and follow POC.

## 2017-11-24 NOTE — PROCEDURES
EEG#:  -2        VIDEO EEG DAY 2        DATE OF RECORDIN2017         SOURCE FILE DURATION:  23 hours 55 minutes.       PATIENT INFORMATION:  Rakesh Hamilton is a 42-year-old male with a history of traumatic brain injury presents with posttraumatic epilepsy, referred for presurgical evaluation.  The patient has some staring spells and convulsive spells.         MEDICATIONS:  None.     TECHNICAL SUMMARY: This continuous video- EEG monitoring procedure was performed with 23 scalp electrodes in 10-20 electrode system placements, and additional scalp, precordial and other surface electrodes used for electrical referencing and artifact detection.  Video monitoring was utilized and periodically reviewed by EEG technologists and the physician for electroclinical correlations.     BACKGROUND:  In the fully awake state, patient has a parietooccipital rhythm of 8-9 Hz, which is symmetric and reactive, well modulated.  In the awake state, there are bursts of intermittent focal slowing in the right temporal region and left fronto-central region, at times there is increased amplitude noted in the left fronto-central region.  The patient has in sleep, well-formed sleep architecture consisting of well-formed asymmetric sleep spindles (increased amplitude on left), vertex waves, K complexes, POSTS and a mixture of delta-theta slowing.       ACTIVATION PROCEDURES:  Hyperventilation was done and no significant abnormalities are identified.       EPILEPTIFORM DISCHARGES: No clear epileptiform discharges. There are sharp transients in the left fronto-central region, but, this is due to breech effect.       ICTAL:  None.       IMPRESSION:  Video EEG day 2:  Awake and sleep video-EEG on day 1 is abnormal due to the presence of intermittent focal slowing in the right temporal region and left fronto-central region consistent with cortical dysfunction in these regions. More so, there is breech effect in the left   fronto-central region consistent skull defect in this region.  No electrographic seizure, paroxsymal behaviors, or epileptiform discharges were seen.       ADALI HERNANDEZ MD

## 2017-11-24 NOTE — MR AVS SNAPSHOT
After Visit Summary   2017    Rakesh Hamilton    MRN: [id number]           Patient Information     Date Of Birth          1975        Visit Information        Provider Department      2017 7:00 AM [facility] EEG TECH 4 [facility] EEG        Today's Diagnoses     Seizures (H)    -  1       Follow-ups after your visit        Who to contact     Please call your clinic at [phone] to:    Ask questions about your health    Make or cancel appointments    Discuss your medicines    Learn about your test results    Speak to your doctor   If you have compliments or concerns about an experience at your clinic, or if you wish to file a complaint, please contact [facility] Patient Relations at [phone] or email us at [email or web address]         Additional Information About Your Visit        MyChart Information     [employer] is an electronic gateway that provides easy, online access to your medical records. With [employer], you can request a clinic appointment, read your test results, renew a prescription or communicate with your care team.     To sign up for [employer] visit the website at [email or web address]   You will be asked to enter the access code listed below, as well as some personal information. Please follow the directions to create your username and password.     Your access code is: [email or web address]  Expires: 2018 12:42 PM     Your access code will  in 90 days. If you need help or a new code, please contact your [facility] or call [phone] for assistance.        Care EveryWhere ID     This is your Care EveryWhere ID. This could be used by other organizations to access your [facility] medical records  [id number]         Blood Pressure from Last 3 Encounters:   17 151/89   17 155/89    Weight from Last 3 Encounters:   17 113.6 kg (250 lb 8 oz)   17 114.8 kg (253 lb)              Today, you  had the following     No orders found for display         Today's Medication Changes      Notice     This visit is during an admission. Changes to the med list made in this visit will be reflected in the After Visit Summary of the admission.             Primary Care Provider Office Phone # Fax #    Nisa France Palmer [phone]       [address] [place] [address]        Equal Access to Services     [facility]: Hadii aad ku hadasho Soomaali, waaxda luqadaha, qaybta kaalmada adeegyada, waxay idiin hayaan adeeg kharash la'aan . So [facility] [phone].    ATENCIÓN: Si habla español, tiene a darnell disposición servicios gratuitos de asistencia lingüística. Llame al [phone].    We comply with applicable federal civil rights laws and [place] laws. We do not discriminate on the basis of race, color, national origin, age, disability, sex, sexual orientation, or gender identity.            Thank you!     Thank you for choosing [facility]  for your care. Our goal is always to provide you with excellent care. Hearing back from our patients is one way we can continue to improve our services. Please take a few minutes to complete the written survey that you may receive in the mail after your visit with us. Thank you!             Your Updated Medication List - Protect others around you: Learn how to safely use, store and throw away your medicines at www.disposemymeds.org.      Notice     This visit is during an admission. Changes to the med list made in this visit will be reflected in the After Visit Summary of the admission.

## 2017-11-24 NOTE — PROCEDURES
EEG #:  -3       VIDEO EEG DAY #3      DATE OF STUDY:  11/24/2017.      SOURCE FILE DURATION:  11 hours and 25 minutes.      PATIENT INFORMATION:  Rakesh Hamilton is a 42-year-old male with a history of traumatic brain injury presents with recurrent spells.  EEG is being done for seizure evaluation.        MEDICATIONS:  No seizure meds.      TECHNICAL SUMMARY: This continuous video- EEG monitoring procedure was performed with 23 scalp electrodes in 10-20 electrode system placements, and additional scalp, precordial and other surface electrodes used for electrical referencing and artifact detection.  Video monitoring was utilized and periodically reviewed by EEG technologists and the physician for electroclinical correlations.     BACKGROUND:  In the fully awake state, the patient has a well-formed parietooccipital rhythm consisting of 8-9 Hz.  Frontal derivations of symmetric beta.  There is sections where he has intermittent slowing in the right temporal lobe and the left frontocentral region.  There is an increase in amplitude noted in the left frontocentral region at times.  He has well-formed sleep architecture consisting of sleep spindles, vertex waves, K complexes and mixture of delta-theta slowing.      ACTIVATION PROCEDURES:  Photic stim and hyperventilation not done.      EPILEPTIFORM DISCHARGES:  The patient has low amplitude sharp transients in the left hemisphere with maximum negativity at F7 followed by T3.  These discharges are seen several times.  A good example is at 03:23:05.  They do seem to involve the entire left temporal chain.  When analyzed on a referential montage, they maximally involve T1, F7, T3, T4 and C3 and get into the P3 range also.  There is some right-sided involvement too with these discharges but they are mainly on the left actually.  They are seen in sleep only.  Their voltage is around 35 -40 microvolts.      ICTAL:  None.      IMPRESSION:  Video EEG day 3 is abnormal due  to the presence of sharp waves in the left hemisphere maximally involving the left frontotemporal region.  These discharges appear to be benign transients of sleep.  They may be BETS and they are only seen in drowsiness and sleep.  There is no after going slow wave.  They were not thought to be epileptiform discharges.  Patient also has intermittent slowing in the right temporal and left frontocentral region consistent with cortical dysfunction in these areas.  There is also a breach effect in the left frontocentral region.  No electrographic seizures were seen and no obvious epileptiform discharges were seen.      SUMMARY OF 3 DAYS OF VIDEO EEG RECORDING:  During these 3 days of video EEG recording, patient had intermittent slowing in the left frontocentral region in the right temporal region suggestive of cortical dysfunction in these areas.  On day #3 of recording there were low voltage sharp transients in the left temporal region only seen in sleep.  These were thought to possibly represent BETS that are normal variants.  No convincing epileptiform discharges were seen.  The patient at times did have asymmetric sleep spindles with increased amplitude noted in the left frontocentral region consistent with a breach effect due to skull defect in this area.  During these 3 days no electrographic seizures were recording, no paroxysmal spells were recorded and no convincing epileptiform discharges were recorded.  The patient wanted to leave early and insisted on leaving on day 3.  He was advised to start carbamazepine.         ADALI HERNANDEZ MD             D: 2017 16:53   T: 2017 17:57   MT: HAWA      Name:     ANITHA SOLIS   MRN:      [id number]        Account:        [id number]   :      1975           Procedure Date: 2017      Document: [id number]

## 2017-11-24 NOTE — PROGRESS NOTES
[facility]; [id number]  [facility]/MIGUEL-d/c'ed before noon  MD: Rober

## 2017-11-24 NOTE — DISCHARGE INSTRUCTIONS
Times of Days   am  pm      Medication Tablet Size Number of Tablets/Capsules Total Daily Dosage     carbamazepine ER  200 mg               Day 1-3     0 tab    1 tab  (200 mg)    200     Day 4-7     1 tab  (200 mg   1 tab  (200 mg   400     Day 8-14     1 tab  (200 mg   2 tab   (400 mg)    600     Day 14 and onward     2 tab   (400 mg)    2 tab   (400 mg)                             YOU MAY HOLD ON THE INCREASE ON DAY 8 IF YOU FEEL UNSTEADY, DIZZY, OR NAUSEATED THEN INCREASE AS SYMPTOMS SUBSIDE.   Please have your levels check in 3 weeks from start of medication.     * * *Do not store medications in the bathroom.  Keep medications away from children!* * *

## 2017-11-25 NOTE — PROCEDURES
EEG#:  -2        VIDEO EEG DAY 2        DATE OF RECORDIN2017         SOURCE FILE DURATION:  23 hours 55 minutes.       PATIENT INFORMATION:  Rakesh Hamilton is a 42-year-old male with a history of traumatic brain injury presents with posttraumatic epilepsy, referred for presurgical evaluation.  The patient has some staring spells and convulsive spells.         MEDICATIONS:  None.      TECHNICAL SUMMARY: This continuous video- EEG monitoring procedure was performed with 23 scalp electrodes in 10-20 electrode system placements, and additional scalp, precordial and other surface electrodes used for electrical referencing and artifact detection.  Video monitoring was utilized and periodically reviewed by EEG technologists and the physician for electroclinical correlations.     BACKGROUND:  In the fully awake state, patient has a parietooccipital rhythm of 8-9 Hz, which is symmetric and reactive, well modulated.  In the awake state, there are bursts of intermittent focal slowing in the right temporal region and left fronto-central region, at times there is increased amplitude noted in the left fronto-central region.  The patient has in sleep, well-formed sleep architecture consisting of well-formed asymmetric sleep spindles (increased amplitude on left), vertex waves, K complexes, POSTS and a mixture of delta-theta slowing.       ACTIVATION PROCEDURES:  Hyperventilation was done and no significant abnormalities are identified.       EPILEPTIFORM DISCHARGES: No clear epileptiform discharges. There are sharp transients in the left fronto-central region, but, this is due to breech effect.        ICTAL:  None.       IMPRESSION:  Video EEG day 2:  Awake and sleep video-EEG on day 1 is abnormal due to the presence of intermittent focal slowing in the right temporal region and left fronto-central region consistent with cortical dysfunction in these regions. More so, there is breech effect in the left   fronto-central region consistent skull defect in this region.  No electrographic seizure, paroxsymal behaviors, or epileptiform discharges were seen.       ADALI HERNANDEZ MD             D: 2017 13:37   T: 2017 19:00   MT: CADE      Name:     ANITHA SOLIS   MRN:      [id number]        Account:        [id number]   :      1975           Procedure Date: 2017      Document: [id number]

## 2017-11-26 NOTE — DISCHARGE SUMMARY
"ATTENDING DISCHARGE NOTE:     Mr. Rakesh Hamilton was admitted for video EEG monitoring to characterize his spell types, which are described as generalized whole body convulsions, episodes of getting confused in which he finds wrong words and occasional twitching.  The patient stopped his Trileptal 2 months ago because he felt on Trileptal, he had a lot of side effects.  The patient did not provide much history.  His mother did much of the talking for him and she states that he had a lot of irritability on it and noticeable depression and the oxcarbazepine does not work for him.  They have tried lamotrigine in the past and state that lamotrigine was not effective for him.  We do not have lamotrigine levels from the past and they do not know what his dose was.  The patient has also tried levetiracetam in the past.  Mom states that he was very tearful when he took this, but she is not sure if it could have been related to his depression.  He has tried only these 3 medications, levetiracetam, lamotrigine and Trileptal.        On this admission, the patient was admitted.  He was sleep deprived in an effort to induce seizures.  He was monitored on video EEG for a total of 3 days.  Unfortunately, patient insisted on leaving and did not feel comfortable staying any longer.  He states that he is not going to have a seizure in the hospital because he is lying in bed and he requested to leave.      Based on patient's clinical presentation of whole body stiffening and jerking with difficulty breathing and cyanosis, I suspect that perhaps these may be generalized tonic-clonic convulsions. He usually has an aura before \"larger seizures\" consisting of a feeling something is going to come on and head spinning. I suspect his seizures may be focal seizures that secondarily generalize.  He does have risk factors for seizures with his traumatic brain injury and his MRI brain in the past which showed volume loss in the left temporal lobe " and frontal lobe.  I would recommend empiric antiepileptic drug treatment with a sodium channel blocker, carbamazepine may be effective. I have discussed starting carbamazepine titration schedule. Mom and  patient and mom were agreeable.  I did review the side effects of carbamazepine with them.  If he continues to have seizure on adequate dose of carbamazepine (theraputic level and highest tolerable dose), we may add a second antiepileptic drug such as lamotrigine.  Mr. Hamilton wanted to try depakote in the future if carbamazepine does not work. If depakote is added to carbamazepine, we have to monitor carbamazepine 10-11 epoxide levels due to drug interactions.  Other antiepileptic drugs that may be considered for presumed focal epilepsy are Dilantin, Vimpat, Gabapentin or Lyrica.      In regards to the patient's mood, Mr. Hamilton has untreated mood and anxiety disorder.  I encouraged him to see a local psychiatrist and consider treatment for his depression.  He was agreeable to following up with a local psychiatrist. On this admission, we did check a vitamin D and his level was low at 19.  He is encouraged to take vitamin D supplements 2000 international units per day.  Lastly, the patient states traumatic brain injury, he has a difficult time concentrating and cognitive issues.  It may be beneficial to complete neuropsychological testing to evaluate what his deficits are post-TBI.  They would like to complete neuropsych testing near their home and state they are not able to come here because they live 8 hours away for that.      Follow up with Dr. Mireles in 1 month phone call and I encouraged them to come back in 6 months for an inpatient visit.  Dr. Mireles will review the CDI, MRI of the brain.  Unfortunately, we were not able to access MRI brain images when we attempted a few times due to the system being down.  The patient and mom were aware of this and they will check with Dr. Mireles in 1 month for the final  MRI brain report.         ADALI HERNANDEZ MD             D: 2017 17:01   T: 2017 09:18   MT: [email or web address]      Name:     ANITHA SOLIS   MRN:      [id number]        Account:        [id number]   :      1975           Admit Date:     [id number]                                  Discharge Date: 2017      Document: [id number]       cc: Nisa Palmer MD

## 2017-11-27 ENCOUNTER — VIRTUAL VISIT (OUTPATIENT)
Dept: NEUROLOGY | Facility: CLINIC | Age: 42
End: 2017-11-27

## 2017-11-27 DIAGNOSIS — R56.9 SEIZURES (H): Primary | ICD-10-CM

## 2017-11-27 NOTE — MR AVS SNAPSHOT
After Visit Summary   2017    Rakesh Hamilton    MRN: [id number]           Patient Information     Date Of Birth          1975        Visit Information        Provider Department      2017 11:30 AM 1, Me [facility] Nurse [facility]        Today's Diagnoses     Seizures (H)    -  1       Follow-ups after your visit        Your next 10 appointments already scheduled     Dec 14, 2017 11:45 AM CST   Telephone Call with MD [facility] ([facility])    [address], [address]  [facility] [address] [phone]           Note: This is not an onsite visit; there is no need to come to the facility.              Who to contact     Please call your clinic at [phone] to:    Ask questions about your health    Make or cancel appointments    Discuss your medicines    Learn about your test results    Speak to your doctor   If you have compliments or concerns about an experience at your clinic, or if you wish to file a complaint, please contact [facility] Patient Relations at [phone] or email us at [email or web address]         Additional Information About Your Visit        MyChart Information     [employer] is an electronic gateway that provides easy, online access to your medical records. With What[employer]rend[employer], you can request a clinic appointment, read your test results, renew a prescription or communicate with your care team.     To sign up for [employer] visit the website at [email or web address]   You will be asked to enter the access code listed below, as well as some personal information. Please follow the directions to create your username and password.     Your access code is: [email or web address]  Expires: 2018 12:42 PM     Your access code will  in 90 days. If you need help or a new code, please contact your [facility] or call [phone] for assistance.         Care EveryWhere ID     This is your Care EveryWhere ID. This could be used by other organizations to access your [place] medical records  [id number]         Blood Pressure from Last 3 Encounters:   11/24/17 151/89   11/20/17 155/89    Weight from Last 3 Encounters:   11/22/17 250 lb 8 oz (113.6 kg)   11/20/17 253 lb (114.8 kg)              Today, you had the following     No orders found for display       Primary Care Provider Office Phone # Fax #    Nisa France Palmer [phone]       [address] [place] [address]        Equal Access to Services     [facility]: Hadii aad ku hadasho Soomaali, waaxda luqadaha, qaybta kaalmada adeegyada, waxjavier brandt . So [facility] [phone].    ATENCIÓN: Si habla español, tiene a darnell disposición servicios gratuitos de asistencia lingüística. Llame al [phone].    We comply with applicable federal civil rights laws and [place] laws. We do not discriminate on the basis of race, color, national origin, age, disability, sex, sexual orientation, or gender identity.            Thank you!     Thank you for choosing [facility]  for your care. Our goal is always to provide you with excellent care. Hearing back from our patients is one way we can continue to improve our services. Please take a few minutes to complete the written survey that you may receive in the mail after your visit with us. Thank you!             Your Updated Medication List - Protect others around you: Learn how to safely use, store and throw away your medicines at www.disposemymeds.org.          This list is accurate as of: 11/27/17 11:51 AM.  Always use your most recent med list.                   Brand Name Dispense Instructions for use Diagnosis    carBAMazepine 200 MG 12 hr capsule    CARBATROL    120 capsule    Start with 1 tablet (200mg) at night. Increase as instructed.    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

## 2017-11-27 NOTE — PROGRESS NOTES
"Discharge phone call placed: 11/27/2017 11:30  Patient was admitted to [facility] from 11/22 to 11/24 for characterization of seizures.  Admission diagnosis:  seizures  Discharge diagnosis:  Same, possibly focal seizures with secondary generalizations.   Patient's understanding of the discharge diagnosis: The same as before I went in. I have epilepsy, grand mal seizures. I have all different kinds of seizures. I was put back on a medicine for the seizures and pn vit D for bones.   Hospital course:  Pt was monitored with video EEG for 3 days, On day 3 he insisted on leaving and did not feel comfortable staying any longer. He did not have any events recorded on VEEG.  Medications confirmed:  - 200, increase in 3 days. Taking vit d 2 tabs once a day.  Has a rash from super glue and from the EEG leads but hasn't worsened and doesn't think it's related to CBZ. Denies other side effects.   Questions regarding admission or discharge instructions:  Denies. States he had a statement regarding the stay instead. He would have liked more information, clarified, about being \"locked up\". We discussed his pre-admission education and santiago routines. He states he is disappointed with himself for not tolerating the hospital stay.   Confirmed follow up scheduled:    Phone call with Dr. Mireles 12/14 11:45.  Pt will make an appointment with psychology.   Pt will get a recommendation for neuropsychologist closer to home.           "

## 2017-12-14 ENCOUNTER — VIRTUAL VISIT (OUTPATIENT)
Dept: NEUROLOGY | Facility: CLINIC | Age: 42
End: 2017-12-14
Payer: MEDICARE

## 2017-12-14 DIAGNOSIS — R56.1 POST-TRAUMATIC SEIZURES (H): Primary | ICD-10-CM

## 2017-12-14 NOTE — PROGRESS NOTES
The patient was admitted to EMU for 3 days.  His EEG did not show any clear epileptiform discharges.  He did not have any seizures while in the hospital and he requested to leave before capturing seizures.  No seizures since discharge from the hospital.  He is taking carbamazepine 400 mg bid. Get a little sleepy after taking morning dose and needs to take a nap, denies other side effects.    His anxiety is the same.  Recently his friend's daughter took her life and he feels terrible.  He hasn't seen a psychiatrist yet, but is looking to see one.    MRI results were discussed.  Patient's questions were addressed.     - Continue  mg bid  - Obtain CBZ/epoxide level, and Na  - Follow up with psychatrist

## 2017-12-14 NOTE — MR AVS SNAPSHOT
After Visit Summary   12/14/2017    Rakesh [facility]    MRN: [id number]           Patient Information     Date Of Birth          1975        Visit Information        Provider Department      12/14/2017 11:45 AM Sara Parekh MD [facility]        Today's Diagnoses     Post-traumatic seizures (H)    -  1       Follow-ups after your visit        Future tests that were ordered for you today     Open Future Orders        Priority Expected Expires Ordered    Carbamazepine and 10 11 epoxide Routine 12/14/2017 12/28/2017 12/14/2017    Sodium Routine 12/14/2017 12/28/2017 12/14/2017            Who to contact     Please call your clinic at [phone] to:    Ask questions about your health    Make or cancel appointments    Discuss your medicines    Learn about your test results    Speak to your doctor   If you have compliments or concerns about an experience at your clinic, or if you wish to file a complaint, please contact [facility] Patient Relations at [phone] or email us at [email or web address]         Additional Information About Your Visit        MyChart Information     [employer] gives you secure access to your electronic health record. If you see a primary care provider, you can also send messages to your care team and make appointments. If you have questions, please call your primary care clinic.  If you do not have a primary care provider, please call [phone] and they will assist you.      [employer] is an electronic gateway that provides easy, online access to your medical records. With [employer], you can request a clinic appointment, read your test results, renew a prescription or communicate with your care team.     To access your existing account, please contact your [facility] or call [phone] for assistance.        Care EveryWhere ID     This is your Care EveryWhere ID. This could be used by other  organizations to access your [facility] medical records  [id number]         Blood Pressure from Last 3 Encounters:   11/24/17 151/89   11/20/17 155/89    Weight from Last 3 Encounters:   11/22/17 250 lb 8 oz (113.6 kg)   11/20/17 253 lb (114.8 kg)               Primary Care Provider Office Phone # Fax #    Nisa France Palmer [phone]       [address] [place] [address]        Equal Access to Services     [facility]: Hadii aad ku hadasho Soomaali, waaxda luqadaha, qaybta kaalmada adeegyada, waxay idiin hayshawn barndt . So [facility] [phone].    ATENCIÓN: Si habla español, tiene a darnell disposición servicios gratuitos de asistencia lingüística. LlUniversity [facility] [phone].    We comply with applicable federal civil rights laws and [place] laws. We do not discriminate on the basis of race, color, national origin, age, disability, sex, sexual orientation, or gender identity.            Thank you!     Thank you for choosing [facility]  for your care. Our goal is always to provide you with excellent care. Hearing back from our patients is one way we can continue to improve our services. Please take a few minutes to complete the written survey that you may receive in the mail after your visit with us. Thank you!             Your Updated Medication List - Protect others around you: Learn how to safely use, store and throw away your medicines at www.disposemymeds.org.          This list is accurate as of: 12/14/17  1:01 PM.  Always use your most recent med list.                   Brand Name Dispense Instructions for use Diagnosis    carBAMazepine 200 MG 12 hr capsule    CARBATROL    120 capsule    Start with 1 tablet (200mg) at night. Increase as instructed.    Generalized convulsive epilepsy with intractable epilepsy (H)       cholecalciferol 1000 UNITS capsule    vitamin  -D    60 capsule    Take 2 capsules (2,000 Units) by mouth daily    Vitamin D deficiency

## 2017-12-19 ENCOUNTER — TRANSFERRED RECORDS (OUTPATIENT)
Dept: HEALTH INFORMATION MANAGEMENT | Facility: CLINIC | Age: 42
End: 2017-12-19

## 2017-12-19 LAB
CARBAM 10, 11 EPOXIDE LEVEL: 1.3 MCG/ML (ref 0.4–4)
CARBAMAZEPINE TOTAL: 10.1 MCG/ML (ref 4–12)
SODIUM SERPL-SCNC: 139 MMOL/L (ref 135–145)

## 2017-12-19 NOTE — PROCEDURES
VIDEO EEG #:  [id number]      DATE OF STUDY:  11/20/2017      SOURCE FILE DURATION:  3 hours 1 minute 5 seconds      CLINICAL SUMMARY:  The patient is a 42-year-old male with a history of TBI and subsequent epilepsy.  Video EEG was performed to evaluate for seizures.     TECHNICAL SUMMARY: This continuous video- EEG monitoring procedure was performed with 23 scalp electrodes in 10-20 electrode system placement, and additional scalp, precordial and other surface electrodes used for electrical referencing and artifact detection.  Video monitoring was utilized and periodically reviewed by EEG technologists and the physician for electroclinical correlations.     INTERICTAL ACTIVITY:  During maximal wakefulness, there was 9 Hz alpha activity over the posterior head regions, which was symmetric and attenuated by eye opening.  Diffuse theta activity was present during waking.  Drowsiness was manifested as increased semirhythmic theta activity, dropout of the posterior dominant rhythm and roving eye movements.  Stage 2 sleep was manifested as vertex waves and ill-defined sleep spindles.  There was focal polymorphic delta slowing over the left temporal head region, maximal left posterior temporal region.  No epileptiform discharges were present.      ACTIVATION MANEUVERS:  Photic stimulation induced symmetric photic driving response in some of the flash frequencies.  Hyperventilation did not add additional information to study.      CLINICAL/ICTAL EVENTS:  No electrographic or clinical seizures were recorded.      IMPRESSION:  This is an abnormal video EEG due to the presence of mild diffuse nonspecific encephalopathy with an additional structural abnormality over the left temporal head region.  No epileptiform discharges were present.  No seizures or paroxysmal behavioral events were recorded.         JEFFERSON SANDERS MD             D: 12/19/2017 08:46   T: 12/19/2017 10:01   MT: [email or web address]      Name:     ANITHA SOLIS   MRN:       [id number]        Account:        [id number]   :      1975           Procedure Date: 2017      Document: [id number]

## 2017-12-27 ENCOUNTER — TELEPHONE (OUTPATIENT)
Dept: NEUROLOGY | Facility: CLINIC | Age: 42
End: 2017-12-27

## 2017-12-27 DIAGNOSIS — R56.1 POST-TRAUMATIC SEIZURES (H): ICD-10-CM

## 2017-12-27 NOTE — TELEPHONE ENCOUNTER
----- Message from Franck Banerjee LPN sent at 12/26/2017  3:32 PM CST -----  Regarding: janine  Caller: Sabrina    Relationship to Patient: mom    Call Back Number: [phone]    Reason for Call: Mom states she was told to call anytime. She said she had some questions about the MRI and if the medical records were sent over to PCP in [place]. She also had some questions about the visit they recently had over the phone with Dr. Mireles. Please call back at your convenience.

## 2017-12-27 NOTE — TELEPHONE ENCOUNTER
Mother reports she is usually involved with phone calls as Rakesh has a poor memory.  She was not able to join in the call with  recently and had some questions. Rakesh told her he was not able to recall the conversations    1. Mother/patient had accessed [employer] and wondered why all the information was not visible.  2. Patient had an MRI, and wondered if if showed any changes from the previous studies.   3. Mother was wondering if the carbamazepine levels were available  4. Mother asking if information had been sent to  as the patinet will see him today.    I explained that [employer] allowed a patient or other authorized users to view some of the medical record, but that not all information is visible.  We discussed the MRI report  Carbamazepine level was discussed. Patient is doing well with no seizures and no apparent side effects. Mother asked when a repeat level was needed. I told her that the local treating providers would make that decision based on the patient's clinical presentation  I reviewed that the communications list included  and .   Mother confirmed that no seizures were seen during the stay. I clarified that no seizures were seen in the time that the patient felt able to tolerate admission, but that he chose to leave before a full evaluation was completed.    No other questions at this time.

## 2017-12-27 NOTE — TELEPHONE ENCOUNTER
Franck Banerjee LPN P Me Dar[facility]                   Caller: Sabrina     Relationship to Patient: mom     Call Back Number: [phone]     Reason for Call: Mom states she was told to call anytime. She said she had some questions about the MRI and if the medical records were sent over to PCP in [place]. She also had some questions about the visit they recently had over the phone with Dr. Mireles. Mother called a second time

## 2020-03-11 ENCOUNTER — HEALTH MAINTENANCE LETTER (OUTPATIENT)
Age: 45
End: 2020-03-11

## 2020-12-27 ENCOUNTER — HEALTH MAINTENANCE LETTER (OUTPATIENT)
Age: 45
End: 2020-12-27

## 2021-04-25 ENCOUNTER — HEALTH MAINTENANCE LETTER (OUTPATIENT)
Age: 46
End: 2021-04-25

## 2021-10-09 ENCOUNTER — HEALTH MAINTENANCE LETTER (OUTPATIENT)
Age: 46
End: 2021-10-09

## 2022-05-21 ENCOUNTER — HEALTH MAINTENANCE LETTER (OUTPATIENT)
Age: 47
End: 2022-05-21

## 2022-09-17 ENCOUNTER — HEALTH MAINTENANCE LETTER (OUTPATIENT)
Age: 47
End: 2022-09-17

## 2023-06-04 ENCOUNTER — HEALTH MAINTENANCE LETTER (OUTPATIENT)
Age: 48
End: 2023-06-04